# Patient Record
Sex: FEMALE | Race: BLACK OR AFRICAN AMERICAN
[De-identification: names, ages, dates, MRNs, and addresses within clinical notes are randomized per-mention and may not be internally consistent; named-entity substitution may affect disease eponyms.]

---

## 2019-02-11 ENCOUNTER — HOSPITAL ENCOUNTER (EMERGENCY)
Dept: HOSPITAL 74 - JERFT | Age: 54
Discharge: HOME | End: 2019-02-11
Payer: COMMERCIAL

## 2019-02-11 VITALS — BODY MASS INDEX: 28.6 KG/M2

## 2019-02-11 VITALS — HEART RATE: 68 BPM | DIASTOLIC BLOOD PRESSURE: 65 MMHG | SYSTOLIC BLOOD PRESSURE: 127 MMHG | TEMPERATURE: 98 F
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
## 2019-02-11 DIAGNOSIS — M62.830: ICD-10-CM

## 2019-02-11 DIAGNOSIS — Z87.19: ICD-10-CM
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
## 2019-02-11 DIAGNOSIS — S39.012A: Primary | ICD-10-CM
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
## 2019-02-11 NOTE — PDOC
History of Present Illness





- General


Chief Complaint: Back Pain


Stated Complaint: LOWER BACK PAIN


Time Seen by Provider: 02/11/19 14:58





- History of Present Illness


Initial Comments: 





02/11/19 16:18


53-year-old female with a past medical history for GERD she takes omeprazole 

intermittently at home presents for evaluation of lower back pain without 

radicular symptoms or systemic symptoms 2 days.





Past History





- Past Medical History


Allergies/Adverse Reactions: 


 Allergies











Allergy/AdvReac Type Severity Reaction Status Date / Time


 


shellfish derived Allergy Severe Rash Verified 12/21/14 20:41











Home Medications: 


Ambulatory Orders





Cyclobenzaprine HCl [Flexeril 10 mg] 10 mg PO HS PRN #10 tablet 02/11/19 


Methylprednisolone [Medrol Dose Julio] 4 mg PO ASDIR #21 tablet 02/11/19 








COPD: No


CHF: No





- Immunization History


Td Vaccination: Yes


Immunization Up to Date: No





- Suicide/Smoking/Psychosocial Hx


Smoking Status: No


Smoking History: Never smoked


Have you smoked in the past 12 months: No


Number of Cigarettes Smoked Daily: 0


Information on smoking cessation initiated: No


Hx Alcohol Use: No


Drug/Substance Use Hx: No





**Review of Systems





- Review of Systems


Musculoskeletal: Yes: Back Pain





*Physical Exam





- Vital Signs


 Last Vital Signs











Temp Pulse Resp BP Pulse Ox


 


 98.0 F   68   16   127/65   99 


 


 02/11/19 14:58  02/11/19 14:58  02/11/19 14:58  02/11/19 14:58  02/11/19 14:58














- Physical Exam


Comments: 





02/11/19 16:19


Lumbar spine skin color and temperature are normal. Range of motion is slightly 

decreased. There is no midline tenderness. There is moderate paralumbar 

musculature spasm and tenderness bilaterally. 5 out of 5 strength in bilateral 

lower extremities without gross sensorimotor deficits. Negative straight leg 

raise test bilaterally. Thighs and calves are soft and nontender. She is 

neurovascularly intact





Moderate Sedation





- Procedure Monitoring


Vital Signs: 


Procedure Monitoring Vital Signs











Temperature  98.0 F   02/11/19 14:58


 


Pulse Rate  68   02/11/19 14:58


 


Respiratory Rate  16   02/11/19 14:58


 


Blood Pressure  127/65   02/11/19 14:58


 


O2 Sat by Pulse Oximetry (%)  99   02/11/19 14:58











ED Treatment Course





- Medications


Given in the ED: 


ED Medications














Discontinued Medications














Generic Name Dose Route Start Last Admin





  Trade Name Jeremy  PRN Reason Stop Dose Admin


 


Ibuprofen  800 mg  02/11/19 15:00  02/11/19 15:04





  Motrin -  PO  02/11/19 15:01  800 mg





  ONCE ONE   Administration





     





     





     





     














Medical Decision Making





- Medical Decision Making





02/11/19 16:19


medrol dose pack and flexeril f/u with ortho





*DC/Admit/Observation/Transfer


Diagnosis at time of Disposition: 


 Lumbar strain








- Discharge Dispostion


Disposition: HOME


Condition at time of disposition: Stable


Decision to Admit order: No





- Referrals


Referrals: 


Woody Dunn DO [Staff Physician] - 





- Patient Instructions


Printed Discharge Instructions:  Low Back Pain, DI for Low Back Pain


Additional Instructions: 


Return to the emergency room should symptoms worsen or go unresolved. Please 

take the Medrol Dosepak and Flexeril as directed. Tylenol if you need 

additional medication for pain. Do not take any anti-inflammatory such as Advil 

Motrin Aleve or ibuprofen. Follow-up with orthopedic surgery in 1-2 days for 

further evaluation and treatment options. Dr Dunn's office will call you 





- Post Discharge Activity

## 2019-02-11 NOTE — PDOC
Rapid Medical Evaluation


Chief Complaint: Back Pain


Medical Evaluation: 


 Allergies











Allergy/AdvReac Type Severity Reaction Status Date / Time


 


shellfish derived Allergy Severe Rash Verified 12/21/14 20:41








I have performed a brief in-person evaluation of this patient.


The patient presents with a chief complaint of: no pmh C/O nonradiating lower 

back pain since yesterday; denies trauma, urinary complaints, abd pain, n/v, 

numbness around groin


Pertinent physical exam findings: In nad, ambulatory, mild L paraspinal TTP, no 

midline tenderness


I have ordered the following: motrin


The patient will proceed to the ED for further evaluation.








02/11/19 14:58








**Discharge Disposition





- Discharge Dispostion


Condition at time of disposition: Stable





- Referrals





- Patient Instructions





- Post Discharge Activity